# Patient Record
Sex: FEMALE | Race: WHITE | NOT HISPANIC OR LATINO | URBAN - NONMETROPOLITAN AREA
[De-identification: names, ages, dates, MRNs, and addresses within clinical notes are randomized per-mention and may not be internally consistent; named-entity substitution may affect disease eponyms.]

---

## 2018-05-07 PROBLEM — Z98.41: Noted: 2018-05-16

## 2018-05-07 PROBLEM — H26.493: Noted: 2018-11-12

## 2018-05-07 PROBLEM — Z98.42: Noted: 2018-05-16

## 2018-05-07 PROBLEM — E11.9: Noted: 2018-05-07

## 2019-05-13 ENCOUNTER — IMPORTED ENCOUNTER (OUTPATIENT)
Dept: URBAN - NONMETROPOLITAN AREA CLINIC 1 | Facility: CLINIC | Age: 69
End: 2019-05-13

## 2019-05-13 PROCEDURE — 92014 COMPRE OPH EXAM EST PT 1/>: CPT

## 2019-05-13 NOTE — PATIENT DISCUSSION
s/p PCIOL OS 5/1/18s/p PCIOL OD 5/15/18PCIOLs in position Early PCO-Explained symptoms of advancing PCO. -Continue to monitor for now. Pt will notify us if any new symptoms develop. DM s DR-Stressed the importance of keeping blood sugars under control blood pressure under control and weight normalization and regular visits with PCP. -Explained the possible effects of poorly controlled diabetes and the damage that diabetes can cause to ocular health. A1C: 8.4 checked 6 months ago. -Pt instructed to contact our office with any vision changes.

## 2019-11-15 ENCOUNTER — IMPORTED ENCOUNTER (OUTPATIENT)
Dept: URBAN - NONMETROPOLITAN AREA CLINIC 1 | Facility: CLINIC | Age: 69
End: 2019-11-15

## 2019-11-15 PROBLEM — E11.9: Noted: 2019-11-15

## 2019-11-15 PROBLEM — Z96.1: Noted: 2019-11-15

## 2019-11-15 PROBLEM — H35.363: Noted: 2019-11-15

## 2019-11-15 PROBLEM — H26.493: Noted: 2019-11-15

## 2019-11-15 PROCEDURE — 92014 COMPRE OPH EXAM EST PT 1/>: CPT

## 2019-11-15 NOTE — PATIENT DISCUSSION
Early PCO-Explained symptoms of advancing PCO. -Continue to monitor for now. Pt will notify us if any new symptoms develop. DM s DR-Stressed the importance of keeping blood sugars under control blood pressure under control and weight normalization and regular visits with PCP. -Explained the possible effects of poorly controlled diabetes and the damage that diabetes can cause to ocular health. A1C: 8.4 checked 6 months ago. -Pt instructed to contact our office with any vision changes. Letter to Dr. Jostin Hauser - dry-Explained dry AMD and advised that there are currently no treatments available. -Recommend pt begin AREDS 2 MVT and monitoring Amsler grid.-Amsler grid given and explained to pt. Recommend monitoring daily. Pt is to contact our office if any changes are noted.

## 2020-06-19 ENCOUNTER — IMPORTED ENCOUNTER (OUTPATIENT)
Dept: URBAN - NONMETROPOLITAN AREA CLINIC 1 | Facility: CLINIC | Age: 70
End: 2020-06-19

## 2020-06-19 PROBLEM — H35.3131: Noted: 2020-06-19

## 2020-06-19 PROBLEM — Z96.1: Noted: 2020-06-19

## 2020-06-19 PROBLEM — E11.9: Noted: 2020-06-19

## 2020-06-19 PROBLEM — H26.493: Noted: 2019-11-15

## 2020-06-19 PROCEDURE — 92014 COMPRE OPH EXAM EST PT 1/>: CPT

## 2020-06-19 NOTE — PATIENT DISCUSSION
Early PCO-Explained symptoms of advancing PCO. -Continue to monitor for now. Pt will notify us if any new symptoms develop. DM s DR-Stressed the importance of keeping blood sugars under control blood pressure under control and weight normalization and regular visits with PCP. -Explained the possible effects of poorly controlled diabetes and the damage that diabetes can cause to ocular health. A1C: 8.4 checked 6 months ago. -Pt instructed to contact our office with any vision changes. Letter to Dr. Alex Aguilar - dry-Explained dry AMD and advised that there are currently no treatments available. -Recommend pt begin AREDS 2 MVT and monitoring Amsler grid.-Amsler grid given and explained to pt. Recommend monitoring daily. Pt is to contact our office if any changes are noted. myasthenia gravis by hxmonitor

## 2020-07-01 NOTE — PROCEDURE NOTE: CLINICAL
PROCEDURE NOTE: Laser for Retinal Tear #1 OS. Diagnosis: Horseshoe Tear of Retina Without Detachment. Prior to laser, risks/benefits/alternatives to laser discussed including loss of vision, decreased peripheral and night vision, need for more laser and/or surgery and patient wished to proceed. A written consent is on file, and the need for today’s laser was discussed and the patient is understanding and wishes to proceed. Laser Lens: *. Wavelength: Argon Green. Spot size: * um. Pulse power: * mW. Number of pulses: *. Patient tolerated procedure well. There were no complications. Post-op instructions given. Patient given office phone number/answering service number and advised to call immediately should there be loss of vision or pain, or should they have any other questions or concerns. Zoie Padilla

## 2020-07-01 NOTE — PATIENT DISCUSSION
Recommended laser to tear. Discussed alternatives/benefits/risks to include development of new tears, tears along the edge of treated area, and retinal detachment.

## 2020-07-02 NOTE — PATIENT DISCUSSION
I explained that the floaters will slowly fade over a period of months to years but will not likely disappear completely. They are usually well-tolerated.  Intermittent flashes will fade over time as well and do not add worrisome significance but stressed to call ASAP with new symptoms or changes for exam.

## 2020-07-09 NOTE — PATIENT DISCUSSION
Expect that vitreous opacities will resolve with time.  Will take some time to settle rashard, laser does not remove opacities.  No new RT/RD seen on todays exam.

## 2020-07-17 NOTE — PATIENT DISCUSSION
Expect that vitreous opacities will resolve with time.  Will take some time to settle down, laser does not remove opacities.  No new RT/RD seen on todays exam.

## 2020-12-04 NOTE — PATIENT DISCUSSION
Glasses Rx given.  12/4/2020:  try fit with soft CTL daily lens will try +1.25 OTCR for near (works at home).

## 2021-01-25 ENCOUNTER — IMPORTED ENCOUNTER (OUTPATIENT)
Dept: URBAN - NONMETROPOLITAN AREA CLINIC 1 | Facility: CLINIC | Age: 71
End: 2021-01-25

## 2021-01-25 PROBLEM — E11.9: Noted: 2021-01-25

## 2021-01-25 PROBLEM — H35.3131: Noted: 2021-01-25

## 2021-01-25 PROBLEM — Z96.1: Noted: 2021-01-25

## 2021-01-25 PROBLEM — H26.493: Noted: 2021-01-25

## 2021-01-25 PROCEDURE — 92134 CPTRZ OPH DX IMG PST SGM RTA: CPT

## 2021-01-25 PROCEDURE — 99213 OFFICE O/P EST LOW 20 MIN: CPT

## 2021-01-25 NOTE — PATIENT DISCUSSION
DM s DR- Stressed the importance of keeping blood sugars under control blood pressure under control and weight normalization and regular visits with PCP.- Explained the possible effects of poorly controlled diabetes and the damage that diabetes can cause to ocular health. - Pt instructed to contact our office with any vision changes. AMD - dry - low suspicion- OCT done today: stable OU- Explained dry AMD and advised that there are currently no treatments available. - Recommend pt begin AREDS 2 MVT and monitoring Amsler grid. - Amsler grid given and explained to pt. Recommend monitoring daily. Pt is to contact our office if any changes are noted. 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

## 2022-03-26 NOTE — PATIENT DISCUSSION
Most likely due to retinal tear. No tears seen superiorly and too much blood to adequately visualize inferior fundus. Explained in detail to patient. Discussed case and will see retina on Monday. Rest, no strenuous activity and NPO after midnight evening prior to retina consult.

## 2022-03-28 NOTE — PATIENT DISCUSSION
No RT/RD, Explained that floaters will likely decrease in severity with age and ongoing vitreous liquefaction.

## 2022-03-29 NOTE — PROCEDURE NOTE: CLINICAL
PROCEDURE NOTE: Laser for Retinal Tear #1 OS. Diagnosis: Horseshoe Tear of Retina Without Detachment. Anesthesia: Topical. Prior to laser, risks/benefits/alternatives to laser discussed including loss of vision, decreased peripheral and night vision, need for more laser and/or surgery and patient wished to proceed. A written consent is on file, and the need for today’s laser was discussed and the patient is understanding and wishes to proceed. Laser Lens: *. Wavelength: Argon Green. Spot size: * um. Pulse power: * mW. Number of pulses: *. Patient tolerated procedure well. There were no complications. Post-op instructions given. Patient given office phone number/answering service number and advised to call immediately should there be loss of vision or pain, or should they have any other questions or concerns. Nikki Canchola

## 2022-03-29 NOTE — PATIENT DISCUSSION
The patient will be checked regularly and knows to contact me for increased eye pain or decreased vision.

## 2022-03-29 NOTE — PATIENT DISCUSSION
Explained that surgical removal of floaters is not appropriate for most patients and only done for severe cases.

## 2022-04-05 NOTE — PATIENT DISCUSSION
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 Macular pucker is usually due to previous posterior vitreous detachment but can also be due to uveitis, retinal vascular occlusion, retinal tear, retinal detachment, and idiopathic. Most patients with epiretinal membranes with macular pucker do not progress to the point where intervention is needed. Intervention is typically surgical. The patient can be observed carefully with regular retinal follow-up. If the maculopathy progresses, surgery will be considered.

## 2022-04-05 NOTE — PATIENT DISCUSSION
Tear is well lasered and VH is improving. Discussed that the blood is being resorbed by the body and that this can take several weeks to months. At this time recommend observation and letting the blood continue to clear on it's own. No new tears seen today. Will follow up in 1 month.

## 2022-04-09 ASSESSMENT — TONOMETRY
OS_IOP_MMHG: 12
OD_IOP_MMHG: 11
OD_IOP_MMHG: 11
OD_IOP_MMHG: 15
OS_IOP_MMHG: 12
OS_IOP_MMHG: 16
OS_IOP_MMHG: 14
OD_IOP_MMHG: 12

## 2022-04-09 ASSESSMENT — VISUAL ACUITY
OS_CC: 20/20
OD_SC: 20/40
OD_CC: 20/25-2
OD_CC: 20/40-2
OS_CC: 20/25
OD_PH: 20/30
OD_CC: 20/40
OS_CC: 20/20
OS_SC: 20/40
OD_CC: 20/25
OS_CC: 20/20
OD_GLARE: 20/30
OS_GLARE: 20/25
OU_CC: 20/20

## 2023-05-25 ENCOUNTER — CLINIC PROCEDURE ONLY (OUTPATIENT)
Dept: RURAL CLINIC 1 | Facility: CLINIC | Age: 73
End: 2023-05-25

## 2023-05-25 DIAGNOSIS — H26.491: ICD-10-CM

## 2023-05-25 PROCEDURE — 66821 AFTER CATARACT LASER SURGERY: CPT

## 2023-05-25 ASSESSMENT — VISUAL ACUITY
OD_BAT: 20/40
OU_SC: 20/70
OS_SC: 20/25
OD_SC: 20/25
OU_SC: 20/25

## 2023-05-25 ASSESSMENT — TONOMETRY
OS_IOP_MMHG: 15
OD_IOP_MMHG: 15

## 2023-06-13 ENCOUNTER — POST-OP (OUTPATIENT)
Dept: RURAL CLINIC 1 | Facility: CLINIC | Age: 73
End: 2023-06-13

## 2023-06-13 DIAGNOSIS — Z98.890: ICD-10-CM

## 2023-06-13 PROCEDURE — 99024 POSTOP FOLLOW-UP VISIT: CPT

## 2023-06-13 ASSESSMENT — TONOMETRY
OS_IOP_MMHG: 14
OD_IOP_MMHG: 14

## 2023-06-13 ASSESSMENT — VISUAL ACUITY
OD_SC: 20/30
OS_SC: 20/20-1

## 2023-12-11 ENCOUNTER — ESTABLISHED PATIENT (OUTPATIENT)
Dept: RURAL CLINIC 1 | Facility: CLINIC | Age: 73
End: 2023-12-11

## 2023-12-11 DIAGNOSIS — H16.223: ICD-10-CM

## 2023-12-11 DIAGNOSIS — Z96.1: ICD-10-CM

## 2023-12-11 DIAGNOSIS — E11.9: ICD-10-CM

## 2023-12-11 DIAGNOSIS — H18.513: ICD-10-CM

## 2023-12-11 DIAGNOSIS — H35.3131: ICD-10-CM

## 2023-12-11 PROCEDURE — 92134 CPTRZ OPH DX IMG PST SGM RTA: CPT

## 2023-12-11 PROCEDURE — 92014 COMPRE OPH EXAM EST PT 1/>: CPT

## 2023-12-11 ASSESSMENT — VISUAL ACUITY
OS_SC: 20/25-2
OD_SC: 20/25+2

## 2023-12-11 ASSESSMENT — TONOMETRY
OS_IOP_MMHG: 16
OD_IOP_MMHG: 16

## 2024-06-17 ENCOUNTER — ESTABLISHED PATIENT (OUTPATIENT)
Dept: RURAL CLINIC 1 | Facility: CLINIC | Age: 74
End: 2024-06-17

## 2024-06-17 DIAGNOSIS — H16.223: ICD-10-CM

## 2024-06-17 DIAGNOSIS — E11.9: ICD-10-CM

## 2024-06-17 DIAGNOSIS — H35.3131: ICD-10-CM

## 2024-06-17 DIAGNOSIS — H18.513: ICD-10-CM

## 2024-06-17 DIAGNOSIS — Z96.1: ICD-10-CM

## 2024-06-17 PROCEDURE — 92014 COMPRE OPH EXAM EST PT 1/>: CPT

## 2024-06-17 ASSESSMENT — VISUAL ACUITY
OD_SC: 20/25-1
OS_SC: 20/20-1

## 2024-06-17 ASSESSMENT — TONOMETRY
OS_IOP_MMHG: 17
OD_IOP_MMHG: 17

## 2024-10-13 NOTE — PATIENT DISCUSSION
Subjective:       Patient ID: Jina Brandon is a 42 y.o. female.    Chief Complaint: Follow-up (Reaction to flu vaccine)    Received flu vaccine at work 2 days ago.  Arm became red that evening warm swollen.  Fingers tingling right hand especially the right 4th and 5th fingers.  Worsening erythema.  She has drawn line arm in it is still expanding.  No fever chills.  No prior reactions.      Physical examination.  Warm slightly indurated reddened area covering much of the lateral arm all the way down to just above the elbow and all the way up to just about the top of the shoulder.  No fluctuance.  Areas slightly tender.  Slightly tender over the ulnar groove also.        Objective:        Assessment:       1. Reaction to influenza immunization, sequela        Plan:       Reaction to influenza immunization, sequela    Other orders  -     doxycycline (VIBRAMYCIN) 100 MG Cap; Take 1 capsule (100 mg total) by mouth 2 (two) times a day.  Dispense: 20 capsule; Refill: 0  -     predniSONE (DELTASONE) 20 MG tablet; Take 1 tablet (20 mg total) by mouth once daily.  Dispense: 5 tablet; Refill: 0    Most likely represents allergic reaction.  But this could be some cellulitis with way it is spreading.  Will place her on Vibramycin 100 b.i.d. for 10 days.  No further flu vaccine.  Zyrtec 10 mg daily.  Prednisone 20 mg a day for 5 days.     see #1-2 for OS.

## 2024-12-17 ENCOUNTER — FOLLOW UP (OUTPATIENT)
Age: 74
End: 2024-12-17

## 2024-12-17 DIAGNOSIS — H16.223: ICD-10-CM

## 2024-12-17 DIAGNOSIS — H18.513: ICD-10-CM

## 2024-12-17 DIAGNOSIS — H35.3131: ICD-10-CM

## 2024-12-17 DIAGNOSIS — E11.9: ICD-10-CM

## 2024-12-17 DIAGNOSIS — Z96.1: ICD-10-CM

## 2024-12-17 PROCEDURE — 92014 COMPRE OPH EXAM EST PT 1/>: CPT

## 2024-12-17 PROCEDURE — 92134 CPTRZ OPH DX IMG PST SGM RTA: CPT

## 2025-06-10 NOTE — PATIENT DISCUSSION
Well lasered, retina attached. PROVIDER:[TOKEN:[[id number]]],PROVIDER:[TOKEN:[[id number]]],FREE:[LAST:[HENRRY Jackson],FIRST:[[facility]],PHONE:[phone]],FAX:[(   [phone]    -]],PROVIDER:[TOKEN:[[id number]]]

## 2025-06-26 ENCOUNTER — FOLLOW UP (OUTPATIENT)
Age: 75
End: 2025-06-26

## 2025-06-26 DIAGNOSIS — H16.223: ICD-10-CM

## 2025-06-26 DIAGNOSIS — H35.3131: ICD-10-CM

## 2025-06-26 DIAGNOSIS — E11.9: ICD-10-CM

## 2025-06-26 DIAGNOSIS — H18.513: ICD-10-CM

## 2025-06-26 DIAGNOSIS — Z96.1: ICD-10-CM

## 2025-06-26 PROCEDURE — 92014 COMPRE OPH EXAM EST PT 1/>: CPT
